# Patient Record
Sex: FEMALE | Race: WHITE | URBAN - METROPOLITAN AREA
[De-identification: names, ages, dates, MRNs, and addresses within clinical notes are randomized per-mention and may not be internally consistent; named-entity substitution may affect disease eponyms.]

---

## 2024-07-05 ENCOUNTER — TELEPHONE (OUTPATIENT)
Dept: OBGYN CLINIC | Facility: CLINIC | Age: 39
End: 2024-07-05

## 2024-07-09 ENCOUNTER — OFFICE VISIT (OUTPATIENT)
Dept: OBGYN CLINIC | Facility: CLINIC | Age: 39
End: 2024-07-09
Payer: COMMERCIAL

## 2024-07-09 DIAGNOSIS — Z12.31 BREAST CANCER SCREENING BY MAMMOGRAM: ICD-10-CM

## 2024-07-09 DIAGNOSIS — R92.30 DENSE BREAST TISSUE: ICD-10-CM

## 2024-07-09 DIAGNOSIS — Z30.432 ENCOUNTER FOR REMOVAL OF INTRAUTERINE CONTRACEPTIVE DEVICE: ICD-10-CM

## 2024-07-09 DIAGNOSIS — Z00.00 ENCOUNTER FOR ANNUAL PHYSICAL EXAM: Primary | ICD-10-CM

## 2024-07-09 PROCEDURE — 58300 INSERT INTRAUTERINE DEVICE: CPT | Performed by: OBSTETRICS & GYNECOLOGY

## 2024-07-09 PROCEDURE — 99386 PREV VISIT NEW AGE 40-64: CPT | Performed by: OBSTETRICS & GYNECOLOGY

## 2024-07-09 PROCEDURE — 58301 REMOVE INTRAUTERINE DEVICE: CPT | Performed by: OBSTETRICS & GYNECOLOGY

## 2024-07-09 NOTE — PROGRESS NOTES
[facility]  Obstetrics and Gynecology  Gynecology Visit    Chief Complaint   Patient presents with    IUD           Edith Patel is a 38 year old female who presents for IUD removal and reinsertion.    LMP: n/a.    Menses regular: n/a.    Menstrual flow normal: n/a.    Birth control or HRT:  Mirena.   Insertion 2016  Last Pap Smear: 2023.  Any history of abnormal paps: No hx abn   Last MMG: n/a  Any Medication Refills needed today?: no  Sleep: 7-8 hours.    Diet: balanced.    Exercise: yes.   Screening labs/Blood work today: no.     Colonoscopy (if over 46 y/o): n/a.   Gardasil:(age 9-46 y/o) up to date  Genetic Cancer screen (if indicated): no.   Flu (Aug-April): n/a.TDAP (every 10 years) up to date.      Additional Problems/concerns:No concerns .      Next Appt: will call    Immunization History   Administered Date(s) Administered    Covid-19 Vaccine Pfizer 30 mcg/0.3 ml 2021, 10/21/2021       No current outpatient medications on file.    No Known Allergies    OB History    Para Term  AB Living   0 0 0 0 0 0   SAB IAB Ectopic Multiple Live Births   0 0 0 0 0       No past medical history on file.    No past surgical history on file.    Family History   Problem Relation Age of Onset    Breast Cancer Paternal Cousin Male 30 - 39        Allergies         Social History     Socioeconomic History    Marital status: Single     Spouse name: Not on file    Number of children: Not on file    Years of education: Not on file    Highest education level: Not on file   Occupational History    Not on file   Tobacco Use    Smoking status: Never     Passive exposure: Never    Smokeless tobacco: Never   Substance and Sexual Activity    Alcohol use: Yes    Drug use: Never    Sexual activity: Not on file   Other Topics Concern    Not on file   Social History Narrative    Not on file     Social Determinants of Health     Financial Resource Strain: Not on file   Food Insecurity: Not on file    Transportation Needs: Not on file   Physical Activity: Not on file   Stress: Not on file   Social Connections: Unknown (3/13/2021)    Received from [facility]    Social Connections     Conversations with friends/family/neighbors per week: Not on file   Housing Stability: Low Risk  (7/7/2021)    Received from [facility]    Housing Stability     Mortgage Payment Concerns?: Not on file     Number of Places Lived in the Last Year: Not on file     Unstable Housing?: Not on file     There were no vitals taken for this visit.    Wt Readings from Last 3 Encounters:   No data found for Wt       Health Maintenance   Topic Date Due    Annual Physical  Never done    Mammogram  Never done    Pap Smear  Never done    COVID-19 Vaccine (3 - 2023-24 season) 09/01/2023    Annual Depression Screening  Never done    Influenza Vaccine (1) 10/01/2024    DTaP,Tdap,and Td Vaccines (3 - Td or Tdap) 10/23/2030    Pneumococcal Vaccine: Birth to 64yrs  Aged Out   There were no vitals taken for this visit.    Wt Readings from Last 3 Encounters:   No data found for Wt         Health Maintenance   Topic Date Due    Influenza Vaccine (1) 08/01/2021    Screen for Cervical Cancer 11/05/2021    DTaP,Tdap and Td Vaccines (3 - Td or Tdap) 03/18/2025    Hepatitis C Screening Completed    HIV Screening Completed    COVID-19 Vaccine Completed     Review of Systems   General: Present- Feeling well. Not Present- Chills, Fever, Weight Gain and Weight Loss.  HEENT: Not Present- Headache and Sore Throat.  Respiratory: Not Present- Cough, Difficulty Breathing, Hemoptysis and Sputum Production.  Cardiovascular: Not Present- Chest Pain, Elevated Blood Pressure, Fainting / Blacking Out and Shortness of Breath.  Gastrointestinal: Not Present- Constipation, Diarrhea, Nausea and Vomiting.  Female Genitourinary: Not Present- Discharge, Dysuria and Frequency.  Musculoskeletal: Not Present- Leg Cramps and Swelling of  Extremities.  Neurological: Not Present- Dizziness and Headaches.  Psychiatric: Not Present- Anxiety and Depression.  Endocrine: Not Present- Appetite Changes, Hair Changes and Thyroid Problems.  Hematology: Not Present- Easy Bruising and Excessive bleeding.  All other systems negative     Physical Exam The physical exam findings are as follows:     General   Mental Status - Alert. General Appearance - Cooperative. Orientation - Oriented X4. Build & Nutrition - Well nourished.    Head and Neck  Thyroid   Gland Characteristics - normal size and consistency.    Chest and Lung Exam   Inspection:   Chest Wall: - Normal.  Percussion:   Quality and Intensity: - Percussion normal.  Palpation: - Palpation normal.  Auscultation:   Breath sounds: - Normal.  Adventitious sounds: - No Adventitious sounds.    Breast   Nipples: Characteristics - Bilateral - Normal. Discharge - Bilateral - None.  Breast - Bilateral - Symmetric.    Cardiovascular   Auscultation: Rhythm - Regular. Heart Sounds - Normal heart sounds.  Murmurs & Other Heart Sounds: Auscultation of the heart reveals - No Murmurs.    Abdomen   Inspection: Inspection of the abdomen reveals - No Hernias. Incisional scars - no incisional scars.  Palpation/Percussion: Palpation and Percussion of the abdomen reveal - Non Tender and No Palpable abdominal masses.  Liver: - Normal.  Auscultation: Auscultation of the abdomen reveals - Bowel sounds normal.    Female Genitourinary     External Genitalia   Perineum - Normal. Bartholin's Gland - Bilateral - Normal. Clitoris - Normal.  Introitus: Characteristics - No Cystocele, Enterocele or Rectocele. Discharge - None.  Labia Majora: Lesions - Bilateral - None. Characteristics - Bilateral - Normal.  Labia Minora: Lesions - Bilateral - None. Characteristics - Bilateral - Normal.  Urethra: Characteristics - Normal. Discharge - None.  [place] Gland - Bilateral - Normal.  Vulva: Characteristics - Normal. Lesions - None.    Speculum &  Bimanual   Vagina:   Vaginal Wall: - Normal.  Vaginal Lesions - None. Vaginal Mucosa - Normal.  Cervix: Characteristics - No Motion tenderness. Discharge - None.  Uterus: Characteristics - Normal. Position - Midposition.  Adnexa: Characteristics - Bilateral - Normal. Masses - No Adnexal Masses.  Wet [place]: pH - 3.8-4.2. Vaginal discharge - Clear  and Thin. Amine Odor - Absent. Main patient complaints - Discharge. Microscopy - Lactobacilli and Epithelial cells.    Rectal   Anorectal Exam: External - normal external exam.    Peripheral Vascular   Upper Extremity:   Palpation: - Pulses bilaterally normal.  Lower Extremity: Inspection - Bilateral - Inspection Normal.  Palpation: Edema - Bilateral - No edema.    Neurologic   Mental Status: - Normal.    Lymphatic  General Lymphatics   Description - Normal .     IUD removal and reinsertion    Risks, benefits, alternatives, and indications of IUD removal reviewed with patient.  The patient voices clear understanding and desires to proceed.  Consent for IUD removal was signed and witnessed by assistant.    Bimanual exam was performed, and the uterus was noted to be anteverted .A speculum was placed in the vagina.  The IUD strings and cervix were visualized.  The IUD strings were grasped with a ring forceps.  The IUD was then removed without difficulty.      The cervix was then prepped with Betadine.  A single-tooth tenaculum was used to grasp the anterior lip of the cervix.  The Mirena IUD was then placed without difficulty.  The IUD strings were trimmed.  EBL was minimal.    The patient tolerated the procedure well.  There were no complications.  IUD information was given to the patient.         1. Encounter for removal of intrauterine contraceptive device  - INSERT INTRAUTERINE DEVICE [73909]  - REMOVE INTRAUTERINE DEVICE [[id number]]  - Levonorgestrel (Mirena) IUD 1 each    2. Encounter for annual physical exam    3. Breast cancer screening by mammogram    4. Dense breast  tissue    5. Extremely dense tissue of both breasts on mammography

## 2024-10-28 ENCOUNTER — PATIENT MESSAGE (OUTPATIENT)
Dept: OBGYN CLINIC | Facility: CLINIC | Age: 39
End: 2024-10-28

## 2024-10-30 ENCOUNTER — PATIENT MESSAGE (OUTPATIENT)
Dept: FAMILY MEDICINE CLINIC | Facility: CLINIC | Age: 39
End: 2024-10-30

## 2024-10-30 ENCOUNTER — OFFICE VISIT (OUTPATIENT)
Dept: FAMILY MEDICINE CLINIC | Facility: CLINIC | Age: 39
End: 2024-10-30
Payer: COMMERCIAL

## 2024-10-30 VITALS
RESPIRATION RATE: 16 BRPM | OXYGEN SATURATION: 99 % | DIASTOLIC BLOOD PRESSURE: 68 MMHG | HEART RATE: 91 BPM | TEMPERATURE: 98 F | SYSTOLIC BLOOD PRESSURE: 122 MMHG | WEIGHT: 119.5 LBS

## 2024-10-30 DIAGNOSIS — M25.561 ACUTE PAIN OF RIGHT KNEE: Primary | ICD-10-CM

## 2024-10-30 PROCEDURE — G2211 COMPLEX E/M VISIT ADD ON: HCPCS | Performed by: NURSE PRACTITIONER

## 2024-10-30 PROCEDURE — 3078F DIAST BP <80 MM HG: CPT | Performed by: NURSE PRACTITIONER

## 2024-10-30 PROCEDURE — 3074F SYST BP LT 130 MM HG: CPT | Performed by: NURSE PRACTITIONER

## 2024-10-30 PROCEDURE — 99204 OFFICE O/P NEW MOD 45 MIN: CPT | Performed by: NURSE PRACTITIONER

## 2024-10-30 RX ORDER — PYRIDOXINE HCL (VITAMIN B6) 25 MG
50000 LOZENGE ON A HANDLE MUCOUS MEMBRANE WEEKLY
COMMUNITY
Start: 2023-04-25

## 2024-10-30 NOTE — PATIENT INSTRUCTIONS
I am leaning more towards diagnosis of patellar tendonitis    Begin taking 400-600mg of ibuprofen twice daily (at least 6 hours apart) take with food to avoid stomach upset    Recommending to avoid alcohol with use of ibuprofen, do not drink alcohol especially within 4-6 hours of taking ibuprofen as this may increase risk of GI bleed/ulcers    Continue icing/heating as needed    Ice typically helps with swelling and nerve pain   Heat typically helps with relaxing muscle and bringing blood flow to the area    For a more natural remedy:  You may also consider capsaicin cream to apply to right knee for pain and/or unsweetened tart cherry juice to help naturally reduce inflammation, about 2 tablespoons (30ml) of tart cherry juice concentrate or an 8 oz. glass of tart cherry juice daily    If pain worsens or fails to improve after 1 week please follow-up in office

## 2024-10-30 NOTE — PROGRESS NOTES
CHIEF COMPLAINT:    Chief Complaint   Patient presents with    Knee Pain     Right knee pain x 3 1/2 weeks       HISTORY OF PRESENT ILLNESS:    Edith who has a history of plantar fasciitis and TMJ that was corrected surgically presents today, 2024, for right knee pain.    Began approximately 3.5 weeks ago  Woke up with pain in the morning to right knee  Comes and goes  2024 pain worsened  Described as soreness, tightness on sides, and tightness behind knee  2024 pain worsening and swelling began  Swelling located to left side of right knee  Warm to the touch  Rates severity 6/10  Has tried managing with application of heat and ice, prefers to avoid medications  Physically active, daily exercise, yoga and walking  More kneeling than usual due to new puppy at home  Denies known injuries or falls  Denies no known fhx of autoimmune disorders    Jaw surgery, for TMJ around 15yo - no complication with anesthesia  [place] teeth removal without complication    ALLERGIES:  Allergies[1]    CURRENT MEDICATIONS:  Current Outpatient Medications   Medication Sig Dispense Refill    Cholecalciferol (REPLESTA) 1.25 MG (72170 UT) Oral Wafer Take 50,000 Units by mouth once a week.      Levonorgestrel 20 MCG/DAY Intrauterine IUD 1 Intra Uterine Device by Intrauterine route one time.         MEDICAL HISTORY:  No past medical history on file.  No past surgical history on file.  Family History   Problem Relation Age of Onset    Breast Cancer Paternal Cousin Male 30 - 39     Family Status   Relation Status    Fa     Mo Alive    Pat Cous Mal Other     Social History     Socioeconomic History    Marital status: Single   Tobacco Use    Smoking status: Never     Passive exposure: Never    Smokeless tobacco: Never   Substance and Sexual Activity    Alcohol use: Yes    Drug use: Never     Social Drivers of Health      Received from [facility]    Social Connections    Received from  [facility]    Housing Stability       ROS:  GENERAL:  Denies recorded temperatures greater than 100.5F  RESPIRATORY:  Denies difficulty breathing  CARDIAC:  Denies chest pain with exertion    VITALS:   /68   Pulse 91   Temp 97.9 °F (36.6 °C) (Temporal)   Resp 16   Wt 119 lb 8 oz (54.2 kg)   SpO2 99%     Reviewed by Claudia Reyes MS, APRN, FNP-BC    PHYSICAL EXAM:    Constitutional:       Appears well.  Sitting upright on exam table.  Well developed, well nourished, and in no acute distress  HEENT:      Facial features symmetric. Normocephalic and atraumatic     Sclera anicteric.  Pupils round, equal, reactive to light  Musculoskeletal:         Movements smooth and controlled with appropriate coordination.       Gait is steady, nonantalgic.  KNEE  Right knee with swelling to anterior medial aspect  No erythema.    No knee cap instability or tenderness of bursa.    Warmth comparable to surrounding tissue. No crepitus. Full ROM.  Negative Lauryn test.  Negative anterior and posterior drawer test.  Right knee without laxity  Neuro:       No focal deficits, cranial nerves grossly intact.       Movements smooth and controlled, appropriate coordination for age.  Skin:     Warm and dry without jaundice or rashes.  Psychiatric:         Alert and oriented.  Calm and cooperative.  Speech is clear.     ASSESSMENT & PLAN:    1. Acute pain of right knee  Ddx:  Arthritis vs tendonitis    Edith prefers natural remedies if possible  Discussed unsweetened tart cherry juice and capsaicin  Shared decision making = Ibuprofen 400-600mg BID x 5 days  Ice/heat  Avoid high impact activities  Gentle stretching  Follow-up 1 week PRN       [1] No Known Allergies

## 2024-11-01 NOTE — TELEPHONE ENCOUNTER
The ingestion of cherry has proven effective in lowering urate levels, and previous studies have attributed the suppression of gout-related inflammation to the anti-inflammatory properties of cherry    https://pmc.ncbi.nlm.nih.gov/articles/NIY4272551/

## 2024-11-05 ENCOUNTER — TELEPHONE (OUTPATIENT)
Dept: FAMILY MEDICINE CLINIC | Facility: CLINIC | Age: 39
End: 2024-11-05

## 2025-07-10 ENCOUNTER — OFFICE VISIT (OUTPATIENT)
Dept: OBGYN CLINIC | Facility: CLINIC | Age: 40
End: 2025-07-10

## 2025-07-10 VITALS
BODY MASS INDEX: 20.99 KG/M2 | WEIGHT: 126 LBS | SYSTOLIC BLOOD PRESSURE: 100 MMHG | HEIGHT: 65 IN | DIASTOLIC BLOOD PRESSURE: 60 MMHG

## 2025-07-10 DIAGNOSIS — Z13.29 THYROID DISORDER SCREENING: ICD-10-CM

## 2025-07-10 DIAGNOSIS — R92.30 DENSE BREAST TISSUE: ICD-10-CM

## 2025-07-10 DIAGNOSIS — Z01.419 ENCOUNTER FOR WELL WOMAN EXAM WITH ROUTINE GYNECOLOGICAL EXAM: Primary | ICD-10-CM

## 2025-07-10 DIAGNOSIS — Z30.431 IUD CHECK UP: ICD-10-CM

## 2025-07-10 DIAGNOSIS — Z71.6 ENCOUNTER FOR TOBACCO USE CESSATION COUNSELING: ICD-10-CM

## 2025-07-10 DIAGNOSIS — Z13.0 SCREENING, IRON DEFICIENCY ANEMIA: ICD-10-CM

## 2025-07-10 DIAGNOSIS — Z13.1 SCREENING FOR DIABETES MELLITUS (DM): ICD-10-CM

## 2025-07-10 DIAGNOSIS — Z12.31 ENCOUNTER FOR SCREENING MAMMOGRAM FOR BREAST CANCER: ICD-10-CM

## 2025-07-10 DIAGNOSIS — Z82.49 FAMILY HISTORY OF CARDIOVASCULAR DISEASE: ICD-10-CM

## 2025-07-10 DIAGNOSIS — Z13.220 SCREENING, LIPID: ICD-10-CM

## 2025-07-10 RX ORDER — VITAMIN E 268 MG
CAPSULE ORAL
COMMUNITY
Start: 2024-10-30

## 2025-07-10 NOTE — PROGRESS NOTES
[facility]  Obstetrics and Gynecology  Gynecology Visit    Chief Complaint   Patient presents with    Annual           Edith ALEX Jorge is a 39 year old female who presents for annual exam.    LMP: n/a.    Menses regular: n/a.    Menstrual flow normal: n/a.    Birth control or HRT:  Mirena.  Insertion 2024  Last Pap Smear: 2023.  Any history of abnormal paps: No hx abn   Last MMG: ordered entered  Any Medication Refills needed today?: no  Sleep: 7-8 hours.    Diet: balanced.    Exercise: daily.   Screening labs/Blood work today: no.     Colonoscopy (if over 46 y/o): n/a.   Gardasil:(age 9-46 y/o) up to date  Genetic Cancer screen (if indicated): no.   Flu (Aug-April): n/a.TDAP (every 10 years) up to date.      Additional Problems/concerns: No concerns.      Next Appt: annual scheduled    Immunization History   Administered Date(s) Administered    Covid-19 Vaccine Pfizer 30 mcg/0.3 ml 2021, 10/21/2021    HPV (Gardasil) 10/17/2008, 2008, 2009    TDAP 2010, 10/23/2020       Medications - Current[1]    Allergies[2]    OB History    Para Term  AB Living   0 0 0 0 0 0   SAB IAB Ectopic Multiple Live Births   0 0 0 0 0       Gyn History       No data recorded       No data to display                    Past Medical History[3]    Past Surgical History[4]    Family History[5]     Tobacco  Allergies  Meds  Soc Hx        Social History     Socioeconomic History    Marital status: Single     Spouse name: Not on file    Number of children: Not on file    Years of education: Not on file    Highest education level: Not on file   Occupational History    Not on file   Tobacco Use    Smoking status: Every Day     Passive exposure: Never    Smokeless tobacco: Never    Tobacco comments:     I smoke 2 cigarettes per day.   Vaping Use    Vaping status: Never Used   Substance and Sexual Activity    Alcohol use: Yes     Comment: 10 oz of vodka/whiskey per day    Drug use: Yes     Frequency:  5.0 times per week     Types: Cannabis    Sexual activity: Yes     Partners: Male     Birth control/protection: I.U.D.   Other Topics Concern    Caffeine Concern No    Exercise No    Seat Belt No    Special Diet No    Stress Concern No    Weight Concern No   Social History Narrative    Not on file     Social Drivers of Health     Food Insecurity: Not on file   Transportation Needs: Not on file   Stress: Not on file   Housing Stability: Low Risk  (7/7/2021)    Received from [facility]    Housing Stability     Mortgage Payment Concerns?: Not on file     Number of Places Lived in the Last Year: Not on file     Unstable Housing?: Not on file     /60   Ht 5' 5\" (1.651 m)   Wt 126 lb   BMI 20.97 kg/m²     Wt Readings from Last 3 Encounters:   07/10/25 126 lb   10/30/24 119 lb 8 oz         Health Maintenance   Topic Date Due    Influenza Vaccine (1) 08/01/2021    Screen for Cervical Cancer 11/05/2021    DTaP,Tdap and Td Vaccines (3 - Td or Tdap) 03/18/2025    Hepatitis C Screening Completed    HIV Screening Completed    COVID-19 Vaccine Completed     Review of Systems   General: Present- Feeling well. Not Present- Chills, Fever, Weight Gain and Weight Loss.  HEENT: Not Present- Headache and Sore Throat.  Respiratory: Not Present- Cough, Difficulty Breathing, Hemoptysis and Sputum Production.  Cardiovascular: Not Present- Chest Pain, Elevated Blood Pressure, Fainting / Blacking Out and Shortness of Breath.  Gastrointestinal: Not Present- Constipation, Diarrhea, Nausea and Vomiting.  Female Genitourinary: Not Present- Discharge, Dysuria and Frequency.  Musculoskeletal: Not Present- Leg Cramps and Swelling of Extremities.  Neurological: Not Present- Dizziness and Headaches.  Psychiatric: Not Present- Anxiety and Depression.  Endocrine: Not Present- Appetite Changes, Hair Changes and Thyroid Problems.  Hematology: Not Present- Easy Bruising and Excessive bleeding.  All other systems negative      Physical Exam The physical exam findings are as follows:     General   Mental Status - Alert. General Appearance - Cooperative. Orientation - Oriented X4. Build & Nutrition - Well nourished.    Head and Neck  Thyroid   Gland Characteristics - normal size and consistency.    Chest and Lung Exam   Inspection:   Chest Wall: - Normal.  Percussion:   Quality and Intensity: - Percussion normal.  Palpation: - Palpation normal.  Auscultation:   Breath sounds: - Normal.  Adventitious sounds: - No Adventitious sounds.    Breast   Nipples: Characteristics - Bilateral - Normal. Discharge - Bilateral - None.  Breast - Bilateral - Symmetric. Dense    Cardiovascular   Auscultation: Rhythm - Regular. Heart Sounds - Normal heart sounds.  Murmurs & Other Heart Sounds: Auscultation of the heart reveals - No Murmurs.    Abdomen   Inspection: Inspection of the abdomen reveals - No Hernias. Incisional scars - no incisional scars.  Palpation/Percussion: Palpation and Percussion of the abdomen reveal - Non Tender and No Palpable abdominal masses.  Liver: - Normal.  Auscultation: Auscultation of the abdomen reveals - Bowel sounds normal.    Female Genitourinary     External Genitalia   Perineum - Normal. Bartholin's Gland - Bilateral - Normal. Clitoris - Normal.  Introitus: Characteristics - No Cystocele, Enterocele or Rectocele. Discharge - None.  Labia Majora: Lesions - Bilateral - None. Characteristics - Bilateral - Normal.  Labia Minora: Lesions - Bilateral - None. Characteristics - Bilateral - Normal.  Urethra: Characteristics - Normal. Discharge - None.  [place] Gland - Bilateral - Normal.  Vulva: Characteristics - Normal. Lesions - None.    Bimanual   Vagina:   Vaginal Wall: - Normal.  Vaginal Lesions - None. Vaginal Mucosa - Normal.  Cervix: Characteristics - No Motion tenderness. Discharge - None. IUD at cervical os  Uterus: Characteristics - Normal. Position - Midposition.  Adnexa: Characteristics - Bilateral - Normal. Masses - No  Adnexal Masses.    Rectal   Anorectal Exam: External - normal external exam.    Peripheral Vascular   Upper Extremity:   Palpation: - Pulses bilaterally normal.  Lower Extremity: Inspection - Bilateral - Inspection Normal.  Palpation: Edema - Bilateral - No edema.    Neurologic   Mental Status: - Normal.    Lymphatic  General Lymphatics   Description - Normal .       1. Encounter for well woman exam with routine gynecological exam    2. Encounter for screening mammogram for breast cancer  - [facility] 2D+3D SCREENING BILAT (CPT=77067/68068); Future    3. Encounter for tobacco use cessation counseling  - Smoking Cessation less than 3 minutes    4. Family history of cardiovascular disease    5. Dense breast tissue    6. Screening for diabetes mellitus (DM)    7. Thyroid disorder screening    8. Screening, lipid    9. Screening, iron deficiency anemia    10. IUD check up    Routine labs last collected in 2015. Will redraw.   No hx of abnormal pap smears, last done in 2023. Not indicated at this time   Smoking cessation counseling provided. Pt not interested in quitting.   Dense breasts palpated on exam. Ultrasound ordered.   Positive paternal history of heart disease. CT heart score ordered.   F/u in 1 year for routine gyn check     Saida WELLS-S      Patient seen and examined, Agree with assessment and plan of care documented by PA student.     Tere Perez MD               [1]   Current Outpatient Medications:     Flaxseed, Linseed, (FLAXSEED OIL) 1000 MG Oral Cap, , Disp: , Rfl:     Levonorgestrel 20 MCG/DAY Intrauterine IUD, 1 Intra Uterine Device by Intrauterine route one time., Disp: , Rfl:     Cholecalciferol (REPLESTA) 1.25 MG (46363 UT) Oral Wafer, Take 50,000 Units by mouth once a week. (Patient taking differently: Take 50,000 Units by mouth every 30 (thirty) days.), Disp: , Rfl:   [2] No Known Allergies  [3]   Past Medical History:   Amenorrhea    Because of Mirena implant, however, I have noticed  occasional spotting.    Migraine headache without aura    Probably only 8-10 within the last 5 years.  They used to be more frequent prior to 2020.    Plantar fasciitis   [4]   Past Surgical History:  Procedure Laterality Date    Insert intrauterine device  12/23/2016 and 7/9/2024.    Other surgical history  1999    TMJ surgery to correct jaw.    Remove intrauterine device  7/9/2024 but new one inserted.    Tmj repair of joint disc  1999    [place] teeth removed     [5]   Family History  Problem Relation Age of Onset    Lung Disorder Maternal Grandfather     Cancer Maternal Grandfather     Colon Cancer Paternal Aunt     Breast Cancer Paternal Cousin Male 30 - 39